# Patient Record
Sex: MALE | URBAN - NONMETROPOLITAN AREA
[De-identification: names, ages, dates, MRNs, and addresses within clinical notes are randomized per-mention and may not be internally consistent; named-entity substitution may affect disease eponyms.]

---

## 2018-03-12 ENCOUNTER — DOCTOR'S OFFICE (OUTPATIENT)
Dept: URBAN - NONMETROPOLITAN AREA CLINIC 1 | Facility: CLINIC | Age: 33
Setting detail: OPHTHALMOLOGY
End: 2018-03-12
Payer: COMMERCIAL

## 2018-03-12 ENCOUNTER — RX ONLY (RX ONLY)
Age: 33
End: 2018-03-12

## 2018-03-12 DIAGNOSIS — T15.02XA: ICD-10-CM

## 2018-03-12 DIAGNOSIS — S05.02XA: ICD-10-CM

## 2018-03-12 PROCEDURE — 92002 INTRM OPH EXAM NEW PATIENT: CPT | Performed by: OPHTHALMOLOGY

## 2018-03-12 PROCEDURE — 65222 REMOVE FOREIGN BODY FROM EYE: CPT | Performed by: OPHTHALMOLOGY

## 2018-03-12 ASSESSMENT — CORNEAL TRAUMA - ABRASION: OS_ABRASION: PRESENT

## 2018-03-12 ASSESSMENT — REFRACTION_MANIFEST
OD_VA2: 20/
OS_VA1: 20/
OS_VA2: 20/
OS_VA2: 20/
OD_VA1: 20/
OU_VA: 20/
OS_VA1: 20/
OS_VA3: 20/
OD_VA3: 20/
OD_VA2: 20/
OS_VA3: 20/
OU_VA: 20/
OD_VA3: 20/
OD_VA3: 20/
OD_VA1: 20/
OS_VA2: 20/
OD_VA1: 20/
OU_VA: 20/
OS_VA3: 20/
OS_VA1: 20/
OD_VA2: 20/

## 2018-03-12 ASSESSMENT — REFRACTION_CURRENTRX
OS_OVR_VA: 20/
OD_OVR_VA: 20/
OS_OVR_VA: 20/
OS_OVR_VA: 20/
OD_OVR_VA: 20/
OD_OVR_VA: 20/

## 2018-03-12 ASSESSMENT — CONFRONTATIONAL VISUAL FIELD TEST (CVF)
OS_FINDINGS: FULL
OD_FINDINGS: FULL

## 2018-03-12 ASSESSMENT — VISUAL ACUITY
OS_BCVA: 20/20
OD_BCVA: 20/20

## 2020-02-03 ENCOUNTER — APPOINTMENT (EMERGENCY)
Dept: RADIOLOGY | Facility: HOSPITAL | Age: 35
End: 2020-02-03
Payer: COMMERCIAL

## 2020-02-03 ENCOUNTER — HOSPITAL ENCOUNTER (EMERGENCY)
Facility: HOSPITAL | Age: 35
Discharge: HOME | End: 2020-02-03
Attending: EMERGENCY MEDICINE | Admitting: EMERGENCY MEDICINE
Payer: COMMERCIAL

## 2020-02-03 VITALS
BODY MASS INDEX: 32.21 KG/M2 | DIASTOLIC BLOOD PRESSURE: 77 MMHG | HEIGHT: 70 IN | TEMPERATURE: 98.6 F | HEART RATE: 70 BPM | SYSTOLIC BLOOD PRESSURE: 120 MMHG | WEIGHT: 225 LBS | OXYGEN SATURATION: 94 % | RESPIRATION RATE: 20 BRPM

## 2020-02-03 DIAGNOSIS — J40 BRONCHITIS: ICD-10-CM

## 2020-02-03 DIAGNOSIS — J10.1 INFLUENZA B: Primary | ICD-10-CM

## 2020-02-03 LAB
ANION GAP SERPL CALCULATED.3IONS-SCNC: 7 MMOL/L (ref 4–13)
BASOPHILS # BLD AUTO: 0.04 THOUSANDS/ΜL (ref 0–0.1)
BASOPHILS NFR BLD AUTO: 1 % (ref 0–1)
BUN SERPL-MCNC: 12 MG/DL (ref 5–25)
CALCIUM SERPL-MCNC: 8.5 MG/DL (ref 8.3–10.1)
CHLORIDE SERPL-SCNC: 102 MMOL/L (ref 100–108)
CO2 SERPL-SCNC: 30 MMOL/L (ref 21–32)
CREAT SERPL-MCNC: 0.79 MG/DL (ref 0.6–1.3)
EOSINOPHIL # BLD AUTO: 0.07 THOUSAND/ΜL (ref 0–0.61)
EOSINOPHIL NFR BLD AUTO: 1 % (ref 0–6)
ERYTHROCYTE [DISTWIDTH] IN BLOOD BY AUTOMATED COUNT: 14.1 % (ref 11.6–15.1)
FLUAV RNA NPH QL NAA+PROBE: ABNORMAL
FLUBV RNA NPH QL NAA+PROBE: DETECTED
GFR SERPL CREATININE-BSD FRML MDRD: 116 ML/MIN/1.73SQ M
GLUCOSE SERPL-MCNC: 91 MG/DL (ref 65–140)
HCT VFR BLD AUTO: 51.2 % (ref 36.5–49.3)
HGB BLD-MCNC: 17 G/DL (ref 12–17)
IMM GRANULOCYTES # BLD AUTO: 0.02 THOUSAND/UL (ref 0–0.2)
IMM GRANULOCYTES NFR BLD AUTO: 0 % (ref 0–2)
LYMPHOCYTES # BLD AUTO: 1.69 THOUSANDS/ΜL (ref 0.6–4.47)
LYMPHOCYTES NFR BLD AUTO: 25 % (ref 14–44)
MCH RBC QN AUTO: 30.6 PG (ref 26.8–34.3)
MCHC RBC AUTO-ENTMCNC: 33.2 G/DL (ref 31.4–37.4)
MCV RBC AUTO: 92 FL (ref 82–98)
MONOCYTES # BLD AUTO: 0.92 THOUSAND/ΜL (ref 0.17–1.22)
MONOCYTES NFR BLD AUTO: 14 % (ref 4–12)
NEUTROPHILS # BLD AUTO: 4.05 THOUSANDS/ΜL (ref 1.85–7.62)
NEUTS SEG NFR BLD AUTO: 59 % (ref 43–75)
NRBC BLD AUTO-RTO: 0 /100 WBCS
PLATELET # BLD AUTO: 317 THOUSANDS/UL (ref 149–390)
PMV BLD AUTO: 10.8 FL (ref 8.9–12.7)
POTASSIUM SERPL-SCNC: 4.3 MMOL/L (ref 3.5–5.3)
RBC # BLD AUTO: 5.55 MILLION/UL (ref 3.88–5.62)
RSV RNA NPH QL NAA+PROBE: ABNORMAL
S PYO DNA THROAT QL NAA+PROBE: NORMAL
SODIUM SERPL-SCNC: 139 MMOL/L (ref 136–145)
WBC # BLD AUTO: 6.79 THOUSAND/UL (ref 4.31–10.16)

## 2020-02-03 PROCEDURE — 96361 HYDRATE IV INFUSION ADD-ON: CPT

## 2020-02-03 PROCEDURE — 99284 EMERGENCY DEPT VISIT MOD MDM: CPT

## 2020-02-03 PROCEDURE — 71046 X-RAY EXAM CHEST 2 VIEWS: CPT

## 2020-02-03 PROCEDURE — 87631 RESP VIRUS 3-5 TARGETS: CPT | Performed by: EMERGENCY MEDICINE

## 2020-02-03 PROCEDURE — 80048 BASIC METABOLIC PNL TOTAL CA: CPT | Performed by: EMERGENCY MEDICINE

## 2020-02-03 PROCEDURE — 36415 COLL VENOUS BLD VENIPUNCTURE: CPT | Performed by: EMERGENCY MEDICINE

## 2020-02-03 PROCEDURE — 99284 EMERGENCY DEPT VISIT MOD MDM: CPT | Performed by: EMERGENCY MEDICINE

## 2020-02-03 PROCEDURE — 85025 COMPLETE CBC W/AUTO DIFF WBC: CPT | Performed by: EMERGENCY MEDICINE

## 2020-02-03 PROCEDURE — 94640 AIRWAY INHALATION TREATMENT: CPT

## 2020-02-03 PROCEDURE — 87651 STREP A DNA AMP PROBE: CPT | Performed by: EMERGENCY MEDICINE

## 2020-02-03 PROCEDURE — 96374 THER/PROPH/DIAG INJ IV PUSH: CPT

## 2020-02-03 RX ORDER — METHYLPREDNISOLONE SODIUM SUCCINATE 125 MG/2ML
60 INJECTION, POWDER, LYOPHILIZED, FOR SOLUTION INTRAMUSCULAR; INTRAVENOUS ONCE
Status: COMPLETED | OUTPATIENT
Start: 2020-02-03 | End: 2020-02-03

## 2020-02-03 RX ORDER — PREDNISONE 20 MG/1
20 TABLET ORAL DAILY
Qty: 5 TABLET | Refills: 0 | Status: SHIPPED | OUTPATIENT
Start: 2020-02-03 | End: 2020-02-08

## 2020-02-03 RX ORDER — IPRATROPIUM BROMIDE AND ALBUTEROL SULFATE 2.5; .5 MG/3ML; MG/3ML
3 SOLUTION RESPIRATORY (INHALATION)
Status: DISCONTINUED | OUTPATIENT
Start: 2020-02-03 | End: 2020-02-04 | Stop reason: HOSPADM

## 2020-02-03 RX ORDER — BENZONATATE 100 MG/1
100 CAPSULE ORAL ONCE
Status: COMPLETED | OUTPATIENT
Start: 2020-02-03 | End: 2020-02-03

## 2020-02-03 RX ORDER — ACETAMINOPHEN 325 MG/1
650 TABLET ORAL EVERY 6 HOURS PRN
Qty: 30 TABLET | Refills: 0 | Status: SHIPPED | OUTPATIENT
Start: 2020-02-03

## 2020-02-03 RX ORDER — AZITHROMYCIN 250 MG/1
TABLET, FILM COATED ORAL
Qty: 6 TABLET | Refills: 0 | Status: SHIPPED | OUTPATIENT
Start: 2020-02-03 | End: 2020-02-07

## 2020-02-03 RX ORDER — ACETAMINOPHEN 325 MG/1
650 TABLET ORAL ONCE
Status: COMPLETED | OUTPATIENT
Start: 2020-02-03 | End: 2020-02-03

## 2020-02-03 RX ADMIN — IPRATROPIUM BROMIDE AND ALBUTEROL SULFATE 3 ML: 2.5; .5 SOLUTION RESPIRATORY (INHALATION) at 20:40

## 2020-02-03 RX ADMIN — METHYLPREDNISOLONE SODIUM SUCCINATE 60 MG: 125 INJECTION, POWDER, FOR SOLUTION INTRAMUSCULAR; INTRAVENOUS at 20:41

## 2020-02-03 RX ADMIN — BENZONATATE 100 MG: 100 CAPSULE ORAL at 20:40

## 2020-02-03 RX ADMIN — ACETAMINOPHEN 650 MG: 325 TABLET ORAL at 19:58

## 2020-02-03 RX ADMIN — SODIUM CHLORIDE 1000 ML: 0.9 INJECTION, SOLUTION INTRAVENOUS at 19:55

## 2020-02-04 NOTE — ED PROVIDER NOTES
History  Chief Complaint   Patient presents with    Flu Symptoms     Pt states since friday he started with flu like symptoms like cough, congestion, sore throat, body aches, and fevers  No tylenol taken     38-year-old male with a past medical history of asthma and rheumatic fever presents with flu-like symptoms for 3 days  Patient states bed on January 31st he started having extreme fatigue with decreased oral intake, productive cough with yellow sputum, sore throat, nasal congestion, mild headache, and myalgias  Patient took NyQuil at home with no improvement  Patient smokes 0 5 packs per day  Patient denies sick contacts or recent travel  Patient denies neck stiffness, visual or hearing changes, chest pain, shortness of breath, back pain, abdominal pain, nausea, vomiting, diarrhea or urinary symptoms  Wife at bedside        Flu Symptoms   Presenting symptoms: cough, fatigue, myalgias, rhinorrhea and sore throat    Presenting symptoms: no diarrhea, no fever, no headaches, no nausea, no shortness of breath and no vomiting    Cough:     Cough characteristics:  Productive    Sputum characteristics:  Yellow    Severity:  Mild    Onset quality:  Gradual    Duration:  3 days    Timing:  Intermittent    Progression:  Waxing and waning    Chronicity:  New  Fatigue:     Severity:  Severe    Duration:  3 days    Timing:  Constant    Progression:  Worsening  Myalgias:     Location:  Generalized    Quality:  Aching    Severity:  Moderate    Onset quality:  Gradual    Duration:  3 days    Timing:  Constant    Progression:  Unchanged  Rhinorrhea:     Quality:  Clear  Severity:  Mild  Onset quality:  Gradual  Duration:  3 days  Progression:  Unchanged  Chronicity:  New  Relieved by:  Decongestant  Worsened by:  Nothing  Ineffective treatments:  OTC medications  Associated symptoms: decreased appetite and nasal congestion    Associated symptoms: no chills, no decrease in physical activity, no ear pain, no mental status change, no neck stiffness and no syncope    Risk factors: not elderly, no diabetes problem, no heart disease, no immunocompromised state, no kidney disease, no liver disease and no sick contacts        None       Past Medical History:   Diagnosis Date    Asthma     Rheumatic fever        History reviewed  No pertinent surgical history  No family history on file  I have reviewed and agree with the history as documented  Social History     Tobacco Use    Smoking status: Current Every Day Smoker     Packs/day: 0 25    Smokeless tobacco: Current User     Types: Chew   Substance Use Topics    Alcohol use: Not Currently    Drug use: Not Currently        Review of Systems   Constitutional: Positive for decreased appetite and fatigue  Negative for chills, diaphoresis and fever  HENT: Positive for congestion, rhinorrhea and sore throat  Negative for drooling, ear pain, facial swelling, nosebleeds, sneezing, trouble swallowing and voice change  Eyes: Negative for photophobia, pain and visual disturbance  Respiratory: Positive for cough  Negative for chest tightness, shortness of breath and wheezing  Cardiovascular: Negative for chest pain, palpitations and leg swelling  Gastrointestinal: Negative for abdominal pain, constipation, diarrhea, nausea and vomiting  Genitourinary: Negative for decreased urine volume, difficulty urinating, discharge, dysuria, flank pain, frequency, hematuria, penile pain, penile swelling, scrotal swelling, testicular pain and urgency  Musculoskeletal: Positive for myalgias  Negative for arthralgias, back pain, neck pain and neck stiffness  Skin: Negative for color change, pallor, rash and wound  Allergic/Immunologic: Negative for immunocompromised state  Neurological: Negative for dizziness, tremors, seizures, syncope, facial asymmetry, speech difficulty, weakness, light-headedness, numbness and headaches  Hematological: Negative for adenopathy  Psychiatric/Behavioral: Negative for agitation, confusion, hallucinations and suicidal ideas  The patient is not nervous/anxious  Physical Exam  Physical Exam   Constitutional: He is oriented to person, place, and time  He appears well-developed and well-nourished  He is cooperative  Non-toxic appearance  He does not have a sickly appearance  He does not appear ill  No distress  HENT:   Head: Normocephalic and atraumatic  Right Ear: Hearing, tympanic membrane, external ear and ear canal normal  Tympanic membrane is not injected, not scarred, not perforated, not erythematous, not retracted and not bulging  Tympanic membrane mobility is normal    Left Ear: Hearing, tympanic membrane, external ear and ear canal normal  Tympanic membrane is not injected, not scarred, not perforated, not erythematous, not retracted and not bulging  Tympanic membrane mobility is normal    Nose: Nose normal  Right sinus exhibits no maxillary sinus tenderness and no frontal sinus tenderness  Left sinus exhibits no maxillary sinus tenderness and no frontal sinus tenderness  Mouth/Throat: Uvula is midline, oropharynx is clear and moist and mucous membranes are normal  Mucous membranes are not pale and not dry  No oral lesions  No trismus in the jaw  No dental abscesses, uvula swelling or lacerations  No oropharyngeal exudate, posterior oropharyngeal edema, posterior oropharyngeal erythema or tonsillar abscesses  No tonsillar exudate  Eyes: Pupils are equal, round, and reactive to light  Conjunctivae, EOM and lids are normal    Neck: Trachea normal, normal range of motion, full passive range of motion without pain and phonation normal  Neck supple  No thyroid mass and no thyromegaly present  Cardiovascular: Regular rhythm, S1 normal, S2 normal, normal heart sounds, intact distal pulses and normal pulses  Tachycardia present  Pulses:       Radial pulses are 2+ on the right side, and 2+ on the left side          Dorsalis pedis pulses are 2+ on the right side, and 2+ on the left side  Pulmonary/Chest: Effort normal and breath sounds normal  No accessory muscle usage or stridor  No tachypnea  No respiratory distress  He has no decreased breath sounds  He has no wheezes  He has no rhonchi  He has no rales  He exhibits no mass, no tenderness, no deformity and no retraction  Abdominal: Soft  Normal appearance and bowel sounds are normal  He exhibits no distension, no ascites and no mass  There is no hepatosplenomegaly  There is no tenderness  There is no rigidity, no rebound, no guarding, no CVA tenderness, no tenderness at McBurney's point and negative Lindquist's sign  No hernia  Hernia confirmed negative in the right inguinal area and confirmed negative in the left inguinal area  Genitourinary: Testes normal and penis normal  No penile tenderness  Musculoskeletal: Normal range of motion  He exhibits no edema, tenderness or deformity  Lymphadenopathy:     He has no cervical adenopathy  No inguinal adenopathy noted on the right or left side  Neurological: He is alert and oriented to person, place, and time  He has normal strength  He is not disoriented  He displays no atrophy and no tremor  No cranial nerve deficit or sensory deficit  He exhibits normal muscle tone  He displays a negative Romberg sign  He displays no seizure activity  Coordination and gait normal  GCS eye subscore is 4  GCS verbal subscore is 5  GCS motor subscore is 6  Patient is AAOx4, GCS 15; speaking clearly and appropriately; motor and sensation intact; visual fields intact; cranial nerves II-XII grossly intact; no facial droop, slurred speech or arm drift   Skin: Skin is warm, dry and intact  Capillary refill takes less than 2 seconds  No abrasion, no bruising, no burn, no ecchymosis, no laceration, no lesion, no petechiae and no rash noted  Rash is not maculopapular  He is not diaphoretic  No erythema  No pallor     Psychiatric: He has a normal mood and affect  His speech is normal and behavior is normal  Judgment and thought content normal  He is not actively hallucinating  Cognition and memory are normal  He is attentive  Nursing note and vitals reviewed        Vital Signs  ED Triage Vitals [02/03/20 1854]   Temperature Pulse Respirations Blood Pressure SpO2   100 5 °F (38 1 °C) 104 20 139/86 97 %      Temp Source Heart Rate Source Patient Position - Orthostatic VS BP Location FiO2 (%)   Temporal Monitor Lying Right arm --      Pain Score       3           Vitals:    02/03/20 1854 02/03/20 2100 02/03/20 2234   BP: 139/86 115/70 120/77   Pulse: 104 85 70   Patient Position - Orthostatic VS: Lying Lying Lying         Visual Acuity      ED Medications  Medications   ipratropium-albuterol (DUO-NEB) 0 5-2 5 mg/3 mL inhalation solution 3 mL (3 mL Nebulization Given 2/3/20 2040)   acetaminophen (TYLENOL) tablet 650 mg (650 mg Oral Given 2/3/20 1958)   sodium chloride 0 9 % bolus 1,000 mL (0 mL Intravenous Stopped 2/3/20 2225)   benzonatate (TESSALON PERLES) capsule 100 mg (100 mg Oral Given 2/3/20 2040)   methylPREDNISolone sodium succinate (Solu-MEDROL) injection 60 mg (60 mg Intravenous Given 2/3/20 2041)       Diagnostic Studies  Results Reviewed     Procedure Component Value Units Date/Time    Influenza A/B and RSV PCR [[id number]]  (Abnormal) Collected:  02/03/20 1953    Lab Status:  Final result Specimen:  Nares from Nasopharyngeal Swab Updated:  02/03/20 2048     INFLUENZA A PCR None Detected     INFLUENZA B PCR Detected     RSV PCR None Detected    Strep A PCR [[id number]]  (Normal) Collected:  02/03/20 1953    Lab Status:  Final result Specimen:  Throat Updated:  02/03/20 2036     STREP A PCR None Detected    Basic metabolic panel [[id number]] Collected:  02/03/20 1953    Lab Status:  Final result Specimen:  Blood from Arm, Right Updated:  02/03/20 2025     Sodium 139 mmol/L      Potassium 4 3 mmol/L      Chloride 102 mmol/L      CO2 30 mmol/L      ANION GAP 7 mmol/L      BUN 12 mg/dL      Creatinine 0 79 mg/dL      Glucose 91 mg/dL      Calcium 8 5 mg/dL      eGFR 116 ml/min/1 73sq m     Narrative:       [place] guidelines for Chronic Kidney Disease (CKD):     Stage 1 with normal or high GFR (GFR > 90 mL/min/1 73 square meters)    Stage 2 Mild CKD (GFR = 60-89 mL/min/1 73 square meters)    Stage 3A Moderate CKD (GFR = 45-59 mL/min/1 73 square meters)    Stage 3B Moderate CKD (GFR = 30-44 mL/min/1 73 square meters)    Stage 4 Severe CKD (GFR = 15-29 mL/min/1 73 square meters)    Stage 5 End Stage CKD (GFR <15 mL/min/1 73 square meters)  Note: GFR calculation is accurate only with a steady state creatinine    CBC and differential [[id number]]  (Abnormal) Collected:  02/03/20 1953    Lab Status:  Final result Specimen:  Blood from Arm, Right Updated:  02/03/20 2008     WBC 6 79 Thousand/uL      RBC 5 55 Million/uL      Hemoglobin 17 0 g/dL      Hematocrit 51 2 %      MCV 92 fL      MCH 30 6 pg      MCHC 33 2 g/dL      RDW 14 1 %      MPV 10 8 fL      Platelets 130 Thousands/uL      nRBC 0 /100 WBCs      Neutrophils Relative 59 %      Immat GRANS % 0 %      Lymphocytes Relative 25 %      Monocytes Relative 14 %      Eosinophils Relative 1 %      Basophils Relative 1 %      Neutrophils Absolute 4 05 Thousands/µL      Immature Grans Absolute 0 02 Thousand/uL      Lymphocytes Absolute 1 69 Thousands/µL      Monocytes Absolute 0 92 Thousand/µL      Eosinophils Absolute 0 07 Thousand/µL      Basophils Absolute 0 04 Thousands/µL                  XR chest 2 views    (Results Pending)              Procedures  Procedures         ED Course  ED Course as of Feb 03 2347   Mon Feb 03, 2020 2045 Strep negative      2047 Chest x-ray read and interpreted by me  Negative for pneumothorax, mediastinal widening, pleural effusion or focal consolidation  2054 Influenza B positive      2200 Reassessed patient    Will discharge home with PCP follow-up and a short course of prednisone with a Z-Bal for bronchitis  Discussed with patient that he is outside of the window for Tamiflu for his influenza B  Strict return to ER precautions discussed with and acknowledged by patient and wife at bedside  Will give patient a work note for several days and a handful of face masks  Wife at bedside will take him home  MDM  Number of Diagnoses or Management Options     Amount and/or Complexity of Data Reviewed  Clinical lab tests: reviewed and ordered  Tests in the radiology section of CPT®: reviewed and ordered  Tests in the medicine section of CPT®: ordered and reviewed  Obtain history from someone other than the patient: yes (Wife at bedside)  Review and summarize past medical records: yes  Independent visualization of images, tracings, or specimens: yes (CXR)    Risk of Complications, Morbidity, and/or Mortality  Presenting problems: moderate  Diagnostic procedures: low  Management options: low    Patient Progress  Patient progress: stable        Disposition  Final diagnoses:   Influenza B   Bronchitis     Time reflects when diagnosis was documented in both MDM as applicable and the Disposition within this note     Time User Action Codes Description Comment    2/3/2020 10:02 PM Alejandro Jalloh Add [J10 1] Influenza B     2/3/2020 10:02 PM Alejandro Jalloh Add [J40] Bronchitis       ED Disposition     ED Disposition Condition Date/Time Comment    Discharge Stable Mon Feb 3, 2020 10:01 PM Erica Reich discharge to home/self care  Follow-up Information     Follow up With Specialties Details Why Angi Baker, DO Internal Medicine Call in 1 day As needed, If symptoms worsen for your influenza B and bronchitis   Michael Fung [facility] [address]            Discharge Medication List as of 2/3/2020 10:04 PM      START taking these medications    Details   acetaminophen (TYLENOL) 325 mg tablet Take 2 tablets (650 mg total) by mouth every 6 (six) hours as needed for mild pain, Starting Mon 2/3/2020, Print      azithromycin (ZITHROMAX Z-ROSHNI) 250 mg tablet Take 2 tablets today then 1 tablet daily x 4 days, Print      predniSONE 20 mg tablet Take 1 tablet (20 mg total) by mouth daily for 5 days, Starting Mon 2/3/2020, Until Sat 2/8/2020, Print           No discharge procedures on file      ED Provider  Electronically Signed by    MD Gwendolyn Galindo MD  02/03/20 9199

## 2022-03-05 ENCOUNTER — HOSPITAL ENCOUNTER (EMERGENCY)
Facility: HOSPITAL | Age: 37
Discharge: HOME | End: 2022-03-05
Attending: EMERGENCY MEDICINE | Admitting: EMERGENCY MEDICINE
Payer: COMMERCIAL

## 2022-03-05 VITALS
TEMPERATURE: 97.5 F | OXYGEN SATURATION: 97 % | SYSTOLIC BLOOD PRESSURE: 139 MMHG | HEART RATE: 76 BPM | WEIGHT: 245 LBS | DIASTOLIC BLOOD PRESSURE: 85 MMHG | RESPIRATION RATE: 18 BRPM | BODY MASS INDEX: 35.15 KG/M2

## 2022-03-05 DIAGNOSIS — S05.02XA ABRASION OF LEFT CORNEA, INITIAL ENCOUNTER: Primary | ICD-10-CM

## 2022-03-05 PROCEDURE — 99283 EMERGENCY DEPT VISIT LOW MDM: CPT

## 2022-03-05 PROCEDURE — 99284 EMERGENCY DEPT VISIT MOD MDM: CPT | Performed by: EMERGENCY MEDICINE

## 2022-03-05 RX ORDER — GABAPENTIN 400 MG/1
400 CAPSULE ORAL 3 TIMES DAILY
COMMUNITY

## 2022-03-05 RX ORDER — OFLOXACIN 3 MG/ML
2 SOLUTION/ DROPS OPHTHALMIC ONCE
Status: COMPLETED | OUTPATIENT
Start: 2022-03-05 | End: 2022-03-05

## 2022-03-05 RX ADMIN — FLUORESCEIN SODIUM 1 STRIP: 1 STRIP OPHTHALMIC at 15:29

## 2022-03-05 RX ADMIN — OFLOXACIN 2 DROP: 3 SOLUTION/ DROPS OPHTHALMIC at 15:29

## 2022-03-05 NOTE — ED PROVIDER NOTES
History  Chief Complaint   Patient presents with   [address]     presents due to gettting metal fragment in L eye, redness and pain, denies blurry vision, tetanus up to date      Patient is a 43-year-old otherwise healthy male presenting to the emergency department complaining of foreign body sensation in the left eye, states he was scratching metal, cutting it in grinding it, when he felt a small piece enter his left eye, he was able to immediately remove a piece of metal but now has a foreign body sensation in the eye, has some redness and increased tearing, no blurred vision, no headache, no discharge from the eye, has a history of metal foreign body in the high 2-3 years ago, had a tetanus shot updated at that time          Prior to Admission Medications   Prescriptions Last Dose Informant Patient Reported? Taking?   acetaminophen (TYLENOL) 325 mg tablet   No No   Sig: Take 2 tablets (650 mg total) by mouth every 6 (six) hours as needed for mild pain   gabapentin (NEURONTIN) 400 mg capsule   Yes Yes   Sig: Take 400 mg by mouth 3 (three) times a day      Facility-Administered Medications: None       Past Medical History:   Diagnosis Date    Asthma     Rheumatic fever        No past surgical history on file  No family history on file  I have reviewed and agree with the history as documented  E-Cigarette/Vaping     E-Cigarette/Vaping Substances     Social History     Tobacco Use    Smoking status: Current Every Day Smoker     Packs/day: 0 25    Smokeless tobacco: Current User     Types: Chew   Substance Use Topics    Alcohol use: Not Currently    Drug use: Not Currently       Review of Systems   Constitutional: Negative  HENT: Negative  Eyes: Positive for pain  Respiratory: Negative  Cardiovascular: Negative  Gastrointestinal: Negative  Endocrine: Negative  Genitourinary: Negative  Musculoskeletal: Negative  Skin: Negative  Allergic/Immunologic: Negative  Neurological: Negative  Hematological: Negative  Psychiatric/Behavioral: Negative  Physical Exam  Physical Exam  Constitutional:       Appearance: He is well-developed  HENT:      Head: Normocephalic and atraumatic  Eyes:      General: Vision grossly intact  Gaze aligned appropriately  Left eye: No foreign body, discharge or hordeolum  Conjunctiva/sclera: Conjunctivae normal       Pupils: Pupils are equal, round, and reactive to light  Comments: No foreign body visualized, 2 small linear corneal abrasions at the 7 o'clock position over the iris of the left eye   Cardiovascular:      Rate and Rhythm: Normal rate  Pulmonary:      Effort: Pulmonary effort is normal    Abdominal:      Palpations: Abdomen is soft  Musculoskeletal:         General: Normal range of motion  Cervical back: Normal range of motion and neck supple  Skin:     General: Skin is warm and dry  Neurological:      Mental Status: He is alert and oriented to person, place, and time           Vital Signs  ED Triage Vitals [03/05/22 1518]   Temperature Pulse Respirations Blood Pressure SpO2   97 5 °F (36 4 °C) 93 18 141/88 97 %      Temp Source Heart Rate Source Patient Position - Orthostatic VS BP Location FiO2 (%)   Temporal Monitor -- -- --      Pain Score       7           Vitals:    03/05/22 1518 03/05/22 1530   BP: 141/88 139/85   Pulse: 93 76               ED Medications  Medications   fluorescein sodium sterile ophthalmic strip 1 strip (1 strip Left Eye Given 3/5/22 1529)   ofloxacin (OCUFLOX) 0 3 % ophthalmic solution 2 drop (2 drops Left Eye Given 3/5/22 1529)       Diagnostic Studies  Results Reviewed     None                 No orders to display                     ED Course  ED Course as of 03/05/22 1540   Sat Mar 05, 2022   1540 Patient with foreign body sensation to left eye after scratching metal, he did remove a piece of metal from his eye prior to coming to the emergency department, exam reveals 2 small corneal abrasions on the left eye, he was given Ocuflox eye drops and instructions for follow-up, advised to return if any additional concerns, patient acknowledges understanding and agreement with this plan                               SBIRT 22yo+      Most Recent Value   SBIRT (24 yo +)    In order to provide better care to our patients, we are screening all of our patients for alcohol and drug use  Would it be okay to ask you these screening questions? Yes Filed at: 03/05/2022 1520   Initial Alcohol Screen: US AUDIT-C     1  How often do you have a drink containing alcohol? 0 Filed at: 03/05/2022 1520   2  How many drinks containing alcohol do you have on a typical day you are drinking? 0 Filed at: 03/05/2022 1520   3a  Male UNDER 65: How often do you have five or more drinks on one occasion? 0 Filed at: 03/05/2022 1520   3b  FEMALE Any Age, or MALE 65+: How often do you have 4 or more drinks on one occassion? 0 Filed at: 03/05/2022 1520   Audit-C Score 0 Filed at: 03/05/2022 1520   HAI: How many times in the past year have you    Used an illegal drug or used a prescription medication for non-medical reasons? Never Filed at: 03/05/2022 1520                      Disposition  Final diagnoses:   Abrasion of left cornea, initial encounter     Time reflects when diagnosis was documented in both MDM as applicable and the Disposition within this note     Time User Action Codes Description Comment    3/5/2022  3:33 PM Rosa Maria James Add [S05  02XA] Abrasion of left cornea, initial encounter       ED Disposition     ED Disposition Condition Date/Time Comment    Discharge Stable Sat Mar 5, 2022  3:33 PM Linette Brown discharge to home/self care              Follow-up Information     Follow up With Specialties Details Why Contact [facility]  In 3 days As needed [facility]  [address] [place] [address]  [phone]          Discharge Medication List as of 3/5/2022  3:34 PM      CONTINUE these medications which have NOT CHANGED    Details   gabapentin (NEURONTIN) 400 mg capsule Take 400 mg by mouth 3 (three) times a day, Historical Med      acetaminophen (TYLENOL) 325 mg tablet Take 2 tablets (650 mg total) by mouth every 6 (six) hours as needed for mild pain, Starting Mon 2/3/2020, Print             No discharge procedures on file      PDMP Review     None          ED Provider  Electronically Signed by           Teresita Mancilla DO  03/05/22 6730